# Patient Record
Sex: FEMALE | Race: WHITE | NOT HISPANIC OR LATINO | URBAN - METROPOLITAN AREA
[De-identification: names, ages, dates, MRNs, and addresses within clinical notes are randomized per-mention and may not be internally consistent; named-entity substitution may affect disease eponyms.]

---

## 2017-07-24 NOTE — PATIENT DISCUSSION
Advised to increase Omega 3 fatty acids (flaxseed oil) in diet or take supplements. 2-4 grams a day.

## 2019-08-20 ENCOUNTER — IMPORTED ENCOUNTER (OUTPATIENT)
Dept: URBAN - METROPOLITAN AREA CLINIC 31 | Facility: CLINIC | Age: 59
End: 2019-08-20

## 2019-08-20 PROCEDURE — 92004 COMPRE OPH EXAM NEW PT 1/>: CPT

## 2019-08-20 PROCEDURE — 92310 CONTACT LENS FITTING OU: CPT

## 2019-08-20 PROCEDURE — 92015 DETERMINE REFRACTIVE STATE: CPT

## 2022-04-01 ASSESSMENT — VISUAL ACUITY
OS_CC: J114''
OD_CC: 20/20

## 2022-04-01 ASSESSMENT — PACHYMETRY
OS_CT_UM: 5753
OD_CT_UM: 586
OD_CT_UM: 540
OS_CT_UM: 545
OD_CT_UM: 580

## 2022-04-01 ASSESSMENT — TONOMETRY
OS_IOP_MMHG: 16
OD_IOP_MMHG: 16